# Patient Record
Sex: FEMALE | Race: WHITE | Employment: UNEMPLOYED | URBAN - METROPOLITAN AREA
[De-identification: names, ages, dates, MRNs, and addresses within clinical notes are randomized per-mention and may not be internally consistent; named-entity substitution may affect disease eponyms.]

---
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
## 2021-03-24 ENCOUNTER — HOSPITAL ENCOUNTER (EMERGENCY)
Age: 21
Discharge: HOME | End: 2021-03-24
Attending: EMERGENCY MEDICINE
Payer: COMMERCIAL

## 2021-03-24 VITALS
TEMPERATURE: 97.9 F | SYSTOLIC BLOOD PRESSURE: 168 MMHG | RESPIRATION RATE: 16 BRPM | HEART RATE: 106 BPM | HEIGHT: 65 IN | DIASTOLIC BLOOD PRESSURE: 82 MMHG | OXYGEN SATURATION: 96 % | WEIGHT: 293 LBS | BODY MASS INDEX: 48.82 KG/M2

## 2021-03-24 DIAGNOSIS — M25.512 LEFT SHOULDER PAIN, UNSPECIFIED CHRONICITY: Primary | ICD-10-CM

## 2021-03-24 PROCEDURE — 99283 EMERGENCY DEPT VISIT LOW MDM: CPT

## 2021-03-24 RX ORDER — PREDNISONE 20 MG/1
40 TABLET ORAL DAILY
Qty: 10 TABLET | Refills: 0 | Status: SHIPPED | OUTPATIENT
Start: 2021-03-24 | End: 2021-03-29

## 2021-03-24 ASSESSMENT — PAIN DESCRIPTION - LOCATION: LOCATION: SHOULDER

## 2021-03-24 ASSESSMENT — PAIN DESCRIPTION - ORIENTATION: ORIENTATION: LEFT

## 2021-03-24 NOTE — ED PROVIDER NOTES
[address] [facility]  ED PROVIDER NOTE    Patient Identification  Pt Name: Eleanor Rodriguez  MRN: [id number]  Cordeliagfmagda 2000  Date of evaluation: 3/24/2021  Provider: Cliff Seymour MD  PCP: No primary care provider on file. Chief Complaint  Shoulder Pain (left. states no injury. pt states yesterday morning shoulder hurt and then it stopped hurting. now shoulder hurts)      HPI  History provided by patient   This is a 22 y.o. female who presents to the ED for left shoulder pain. It radiates down to left fingers. Thinks she may have slept on it wrong. Originates at the left trapezius area. Denies trauma. Still has good function of the left arm. Pain seems intermittent. States that sensation is different but not decreased. No chest pain or shortness of breath. ROS  10 systems reviewed, pertinent positives/negatives per HPI otherwise noted to be negative. I have reviewed the following nursing documentation:  Allergies: Patient has no known allergies. Past medical history: History reviewed. No pertinent past medical history. Past surgical history:   Past Surgical History:   Procedure Laterality Date     SECTION N/A 2019     SECTION performed by Susanne Woodall MD at [address] [place] [address] L&D OR       Home medications:   Previous Medications    No medications on file       Social history:  reports that she has never smoked. She has never used smokeless tobacco. She reports previous alcohol use. She reports that she does not use drugs.     Family history:    Family History   Problem Relation Age of Onset    No Known Problems Mother     High Blood Pressure Father     No Known Problems Sister     No Known Problems Brother     No Known Problems Maternal Aunt     No Known Problems Maternal Uncle     No Known Problems Paternal Aunt     No Known Problems Paternal Uncle     Diabetes Maternal Grandmother     No Known Problems Maternal Grandfather     Diabetes Paternal Grandmother  Cancer Paternal Grandmother     No Known Problems Maternal Cousin     No Known Problems Paternal Cousin     No Known Problems Other          Exam  ED Triage Vitals   BP Temp Temp Source Pulse Resp SpO2 Height Weight   03/24/21 0352 03/24/21 0347 03/24/21 0347 03/24/21 0347 03/24/21 0347 03/24/21 0347 03/24/21 0347 03/24/21 0347   (!) 168/82 97.9 °F (36.6 °C) Oral 106 16 96 % 5' 5\" (1.651 m) 300 lb (136.1 kg)     Nursing note and vitals reviewed. Constitutional: In no acute distress  HENT:      Head: Normocephalic      Ears: External ears normal.      Nose: Nose normal.     Mouth: Membrane mucosa moist   Eyes: No discharge. Neck: Supple. Trachea midline. Cardiovascular: Regular rate. Warm extremities  Pulmonary/Chest: Effort normal. No respiratory distress. CTAB. Abdominal: Soft. No distension. Nontender  : Deferred  Rectal: Deferred   Musculoskeletal: Moves all extremities. No gross deformity. Neurological: Alert and oriented. Face symmetric. Speech is clear. Normal tone left arm. 5 out of 5 bilateral shoulder, elbow, wrist flexors/extensors strength. Normal sensation light palpation over deltoid and entire left upper extremity. Normal reflexes. Skin: Warm and dry. No rash. Psychiatric: Normal mood and affect. Behavior is normal.    Procedures            Radiology  No orders to display       Labs  No results found for this visit on 03/24/21. Screenings           MDM and ED Course  Patient is a 67-year-old woman who presents with left shoulder pain rating down to left fingertips. Likely secondary to radiculopathy. I do not believe that this is an anginal equivalent. Neuro exam unremarkable. Will start off with a course of steroids and ibuprofen. Patient agreeable to following up with her doctor this week . No midline spinal pain. Do not believe that this is due to stroke.   Do not believe that this is due to central cord compressive injury (EMP MDM)    [unfilled]    Final Impression  1. Left shoulder pain, unspecified chronicity        Blood pressure (!) 168/82, pulse 106, temperature 97.9 °F (36.6 °C), temperature source Oral, resp. rate 16, height 5' 5\" (1.651 m), weight 300 lb (136.1 kg), last menstrual period 02/22/2021, SpO2 96 %, unknown if currently breastfeeding. Disposition:  DISPOSITION Decision To Discharge 03/24/2021 04:01:41 AM      Patient Referrals:  No follow-up provider specified. Discharge Medications:  New Prescriptions    PREDNISONE (DELTASONE) 20 MG TABLET    Take 2 tablets by mouth daily for 5 days       Discontinued Medications:  Discontinued Medications    DOCUSATE SODIUM (COLACE, DULCOLAX) 100 MG CAPS    Take 100 mg by mouth 2 times daily as needed for Constipation    FERROUS SULFATE 325 (65 FE) MG TABLET    Take 1 tablet by mouth daily (with breakfast)    IBUPROFEN (ADVIL;MOTRIN) 800 MG TABLET    Take 1 tablet by mouth every 8 hours as needed for Pain    PRENATAL VIT-FE FUMARATE-FA (PRENATAL VITAMIN PO)    Take 1 tablet by mouth       This chart was generated using the Dragon dictation system. I created this record but it may contain dictation errors given the limitations of this technology.         Cliff Seymour MD  03/24/21 [id number]

## 2022-06-21 ENCOUNTER — HOSPITAL ENCOUNTER (OUTPATIENT)
Dept: ULTRASOUND IMAGING | Age: 22
Discharge: HOME | End: 2022-06-21
Payer: COMMERCIAL

## 2022-06-21 DIAGNOSIS — O36.80X0 PREGNANCY WITH INCONCLUSIVE FETAL VIABILITY, SINGLE OR UNSPECIFIED FETUS: ICD-10-CM

## 2022-06-21 PROCEDURE — 76801 OB US < 14 WKS SINGLE FETUS: CPT

## 2022-11-14 ENCOUNTER — HOSPITAL ENCOUNTER (OUTPATIENT)
Age: 22
Discharge: HOME | End: 2022-11-14
Attending: OBSTETRICS & GYNECOLOGY | Admitting: OBSTETRICS & GYNECOLOGY
Payer: COMMERCIAL

## 2022-11-14 VITALS
RESPIRATION RATE: 16 BRPM | BODY MASS INDEX: 48.82 KG/M2 | HEART RATE: 91 BPM | DIASTOLIC BLOOD PRESSURE: 74 MMHG | HEIGHT: 65 IN | TEMPERATURE: 98.3 F | SYSTOLIC BLOOD PRESSURE: 125 MMHG | WEIGHT: 293 LBS

## 2022-11-14 PROCEDURE — 99211 OFF/OP EST MAY X REQ PHY/QHP: CPT

## 2022-11-14 RX ORDER — ASPIRIN 81 MG/1
81 TABLET, CHEWABLE ORAL DAILY
COMMUNITY

## 2022-11-15 NOTE — H&P
Department of Obstetrics and Gynecology  Labor and Delivery  Attending Triage Note      SUBJECTIVE:  leakage of fluid    OBJECTIVE:  22y  at 30.3 weeks EDC 23 by LMP c/w 9w3d sono here for questionable LOF at 1930 pm, no gush of fluid but felt underwear wet so had to change them. Unsure if discharge, urine or amniotic fluid. Has an US in office today JAYDEN 7.58 cm. C/o some suprapubic pelvic pain earlier but currently resolved and denies any pain. Reports good FM, denies VB or CTX. Pregnancy c/b class 3 obesity- BMP 49.4, previous  section x1. Vitals:  Vitals:    22 2219   BP: 125/74   Pulse: 91   Resp: 16   Temp: 98.3 °F (36.8 °C)   TempSrc: Oral   Weight: 297 lb (134.7 kg)   Height: 5' 5\" (1.651 m)       CONSTITUTIONAL:  awake, alert, cooperative, no apparent distress, and appears stated age  ABDOMEN:  Soft non tender  MUSCULOSKELETAL:  Full range of motion    Cervix:  C/L/H    Fetal Position:  Breech (on US today)    Membranes:  Neg pooling, neg ferning, appear to be Intact    Fetal heart rate:         Baseline Heart Rate:  145 bpm         Accelerations:  present       Decelerations:  absent       Variability:  moderate    Contraction frequency: acontractile       ASSESSMENT & PLAN:      IUP at 30 weeks 3 days    No evidence of rupture of membranes  Reassuring fetal status    Neg pooling/neg ferning. Os closed. Recent US today reviewed with patient, JAYDEN wnl. NST reactive. Fetal well being reassuring, FHT reviewed at bedside. Emergency/Leakage/PTL precautions reviewed and when to call office. Follow up for next OB visit on 22 as scheduled. Stable for discharge home.       DO ERICH Morrow

## 2022-11-15 NOTE — FLOWSHEET NOTE
Pt presents to triage with complaints of leaking of fluid and abdominal pain. States noticed leaking around 1900 this evening. Reports +Fm, denies any leaking of fluid.  Will notify Dr. Adarsh Portillo of pt arrival.

## 2022-12-05 ENCOUNTER — APPOINTMENT (OUTPATIENT)
Dept: ULTRASOUND IMAGING | Age: 22
End: 2022-12-05
Payer: COMMERCIAL

## 2022-12-05 ENCOUNTER — HOSPITAL ENCOUNTER (OUTPATIENT)
Age: 22
Discharge: HOME | End: 2022-12-05
Attending: OBSTETRICS & GYNECOLOGY | Admitting: OBSTETRICS & GYNECOLOGY
Payer: COMMERCIAL

## 2022-12-05 VITALS
SYSTOLIC BLOOD PRESSURE: 135 MMHG | BODY MASS INDEX: 48.82 KG/M2 | WEIGHT: 293 LBS | HEART RATE: 99 BPM | DIASTOLIC BLOOD PRESSURE: 80 MMHG | HEIGHT: 65 IN | TEMPERATURE: 98.4 F | RESPIRATION RATE: 18 BRPM

## 2022-12-05 LAB
A/G RATIO: 1.3 (ref 1.1–2.2)
ALBUMIN SERPL-MCNC: 3.7 G/DL (ref 3.4–5)
ALP BLD-CCNC: 79 U/L (ref 40–129)
ALT SERPL-CCNC: 9 U/L (ref 10–40)
ANION GAP SERPL CALCULATED.3IONS-SCNC: 10 MMOL/L (ref 3–16)
APTT: 24.2 SEC (ref 23–34.3)
AST SERPL-CCNC: 13 U/L (ref 15–37)
BACTERIA: ABNORMAL /HPF
BASOPHILS ABSOLUTE: 0.1 K/UL (ref 0–0.2)
BASOPHILS RELATIVE PERCENT: 1 %
BILIRUB SERPL-MCNC: <0.2 MG/DL (ref 0–1)
BILIRUBIN URINE: NEGATIVE
BLOOD, URINE: NEGATIVE
BUN BLDV-MCNC: 5 MG/DL (ref 7–20)
CALCIUM SERPL-MCNC: 9.4 MG/DL (ref 8.3–10.6)
CHLORIDE BLD-SCNC: 102 MMOL/L (ref 99–110)
CLARITY: CLEAR
CO2: 21 MMOL/L (ref 21–32)
COLOR: YELLOW
CREAT SERPL-MCNC: 0.6 MG/DL (ref 0.6–1.1)
CREATININE URINE: 253.2 MG/DL (ref 28–259)
EOSINOPHILS ABSOLUTE: 0.1 K/UL (ref 0–0.6)
EOSINOPHILS RELATIVE PERCENT: 0.6 %
EPITHELIAL CELLS, UA: ABNORMAL /HPF (ref 0–5)
GFR SERPL CREATININE-BSD FRML MDRD: >60 ML/MIN/{1.73_M2}
GLUCOSE BLD-MCNC: 91 MG/DL (ref 70–99)
GLUCOSE URINE: NEGATIVE MG/DL
HCT VFR BLD CALC: 37.4 % (ref 36–48)
HEMOGLOBIN: 12.6 G/DL (ref 12–16)
INR BLD: 1.07 (ref 0.87–1.14)
KETONES, URINE: ABNORMAL MG/DL
LACTATE DEHYDROGENASE: 146 U/L (ref 100–190)
LEUKOCYTE ESTERASE, URINE: ABNORMAL
LYMPHOCYTES ABSOLUTE: 2.1 K/UL (ref 1–5.1)
LYMPHOCYTES RELATIVE PERCENT: 24.7 %
MCH RBC QN AUTO: 30.6 PG (ref 26–34)
MCHC RBC AUTO-ENTMCNC: 33.7 G/DL (ref 31–36)
MCV RBC AUTO: 90.5 FL (ref 80–100)
MICROSCOPIC EXAMINATION: YES
MONOCYTES ABSOLUTE: 0.7 K/UL (ref 0–1.3)
MONOCYTES RELATIVE PERCENT: 8 %
MUCUS: ABNORMAL /LPF
NEUTROPHILS ABSOLUTE: 5.6 K/UL (ref 1.7–7.7)
NEUTROPHILS RELATIVE PERCENT: 65.7 %
NITRITE, URINE: NEGATIVE
PDW BLD-RTO: 12.9 % (ref 12.4–15.4)
PH UA: 6.5 (ref 5–8)
PLATELET # BLD: 227 K/UL (ref 135–450)
PMV BLD AUTO: 8.2 FL (ref 5–10.5)
POTASSIUM SERPL-SCNC: 4.1 MMOL/L (ref 3.5–5.1)
PROTEIN PROTEIN: 27 MG/DL
PROTEIN UA: NEGATIVE MG/DL
PROTEIN/CREAT RATIO: 0.1 MG/DL
PROTHROMBIN TIME: 13.8 SEC (ref 11.7–14.5)
RBC # BLD: 4.14 M/UL (ref 4–5.2)
RBC UA: ABNORMAL /HPF (ref 0–4)
SODIUM BLD-SCNC: 133 MMOL/L (ref 136–145)
SPECIFIC GRAVITY UA: 1.02 (ref 1–1.03)
TOTAL PROTEIN: 6.5 G/DL (ref 6.4–8.2)
URIC ACID, SERUM: 5.1 MG/DL (ref 2.6–6)
URINE TYPE: ABNORMAL
UROBILINOGEN, URINE: 0.2 E.U./DL
WBC # BLD: 8.6 K/UL (ref 4–11)
WBC UA: ABNORMAL /HPF (ref 0–5)

## 2022-12-05 PROCEDURE — 99211 OFF/OP EST MAY X REQ PHY/QHP: CPT

## 2022-12-05 PROCEDURE — 84156 ASSAY OF PROTEIN URINE: CPT

## 2022-12-05 PROCEDURE — 83615 LACTATE (LD) (LDH) ENZYME: CPT

## 2022-12-05 PROCEDURE — 85730 THROMBOPLASTIN TIME PARTIAL: CPT

## 2022-12-05 PROCEDURE — 85610 PROTHROMBIN TIME: CPT

## 2022-12-05 PROCEDURE — 81001 URINALYSIS AUTO W/SCOPE: CPT

## 2022-12-05 PROCEDURE — 84550 ASSAY OF BLOOD/URIC ACID: CPT

## 2022-12-05 PROCEDURE — 85025 COMPLETE CBC W/AUTO DIFF WBC: CPT

## 2022-12-05 PROCEDURE — 36415 COLL VENOUS BLD VENIPUNCTURE: CPT

## 2022-12-05 PROCEDURE — 82570 ASSAY OF URINE CREATININE: CPT

## 2022-12-05 PROCEDURE — 76819 FETAL BIOPHYS PROFIL W/O NST: CPT

## 2022-12-05 PROCEDURE — 80053 COMPREHEN METABOLIC PANEL: CPT

## 2022-12-05 NOTE — PROGRESS NOTES
EFM turned off.  Transport is on their way to transport pt to [address] [facility] [address]

## 2022-12-05 NOTE — PROGRESS NOTES
Pt verbalized understanding of verbal and written discharge instructions and denies having questions at this time. Pt left OB unit at 1645 ambulatory, undelivered, and in stable condition, accompanied by FOB. Patient is not in active labor.

## 2022-12-05 NOTE — PROGRESS NOTES
Dr Gumaro Acevedo reviewed BPP. This RN went over with pt that her fluid is borderline low and to make sure she keeps her appointment with MFM next week. Pt verbalized understanding.

## 2022-12-05 NOTE — H&P
Department of Obstetrics and Gynecology  Labor and Delivery  Attending Triage Note      SUBJECTIVE:  22y  at 33.3 weeks sent from office for HA, spots in vision and trace protein on ur dip. Upon interview in triage she otherwise denies RUQ/epigastric pain, no facial swelling/lower or upper ext swelling. she reports good FM, denies LOF, VB, CTX. Pregnancy c/b class 3 obesity BMI 51, previous  section x1, lapse in care only 5-6 prenatal visit total.  She is scheduled for a transfer of care visit to [place] [id number] on 22.        OBJECTIVE    Vitals:  Vitals:    22 1615 22 1630 22 1645   BP: 128/74 128/81 131/81   Pulse: 88 88 88   Resp: 18     Temp: 98.4 °F (36.9 °C)     TempSrc: Oral     Weight: (!) 306 lb (138.8 kg)     Height: 5' 5\" (1.651 m)         CONSTITUTIONAL:  awake, alert, cooperative, no apparent distress, and appears stated age  ABDOMEN:  Soft non tender  MUSCULOSKELETAL:  Full range of motion    Cervix:  deferred    Fetal heart rate:         Baseline Heart Rate:  135 bpm        Accelerations:  present       Decelerations:  absent       Variability:  moderate    Contraction frequency: irritability     DATA:  Results for orders placed or performed during the hospital encounter of 22   Comprehensive metabolic panel   Result Value Ref Range    CO2 21 21 - 32 mmol/L    Glucose 91 70 - 99 mg/dL    BUN 5 (L) 7 - 20 mg/dL    Creatinine 0.6 0.6 - 1.1 mg/dL    Est, Glom Filt Rate >60 >60    Calcium 9.4 8.3 - 10.6 mg/dL    Total Protein 6.5 6.4 - 8.2 g/dL    Albumin 3.7 3.4 - 5.0 g/dL    Albumin/Globulin Ratio 1.3 1.1 - 2.2    Total Bilirubin <0.2 0.0 - 1.0 mg/dL    Alkaline Phosphatase 79 40 - 129 U/L    ALT 9 (L) 10 - 40 U/L    AST 13 (L) 15 - 37 U/L   CBC with Auto Differential   Result Value Ref Range    WBC 8.6 4.0 - 11.0 K/uL    RBC 4.14 4.00 - 5.20 M/uL    Hemoglobin 12.6 12.0 - 16.0 g/dL    Hematocrit 37.4 36.0 - 48.0 %    MCV 90.5 80.0 - 100.0 fL    MCH 30.6 26.0 - 34.0 pg    MCHC 33.7 31.0 - 36.0 g/dL    RDW 12.9 12.4 - 15.4 %    Platelets 572 095 - 712 K/uL    MPV 8.2 5.0 - 10.5 fL    Neutrophils % 65.7 %    Lymphocytes % 24.7 %    Monocytes % 8.0 %    Eosinophils % 0.6 %    Basophils % 1.0 %    Neutrophils Absolute 5.6 1.7 - 7.7 K/uL    Lymphocytes Absolute 2.1 1.0 - 5.1 K/uL    Monocytes Absolute 0.7 0.0 - 1.3 K/uL    Eosinophils Absolute 0.1 0.0 - 0.6 K/uL    Basophils Absolute 0.1 0.0 - 0.2 K/uL   Protime-INR   Result Value Ref Range    Protime 13.8 11.7 - 14.5 sec    INR 1.07 0.87 - 1.14   Lactate Dehydrogenase   Result Value Ref Range     100 - 190 U/L   Protein / Creatinine Ratio, Urine   Result Value Ref Range    Protein, Ur 27.00 (H) <12 mg/dL    Creatinine, Ur 253.2 28.0 - 259.0 mg/dL    Protein/Creat Ratio 0.1 mg/dL   APTT   Result Value Ref Range    aPTT 24.2 23.0 - 34.3 sec   Uric acid   Result Value Ref Range    Uric Acid, Serum 5.1 2.6 - 6.0 mg/dL   Urinalysis   Result Value Ref Range    Color, UA Yellow Straw/Yellow    Clarity, UA Clear Clear    Glucose, Ur Negative Negative mg/dL    Bilirubin Urine Negative Negative    Ketones, Urine TRACE (A) Negative mg/dL    Specific Gravity, UA 1.025 1.005 - 1.030    Blood, Urine Negative Negative    pH, UA 6.5 5.0 - 8.0    Protein, UA Negative Negative mg/dL    Urobilinogen, Urine 0.2 <2.0 E.U./dL    Nitrite, Urine Negative Negative    Leukocyte Esterase, Urine SMALL (A) Negative    Microscopic Examination YES     Urine Type NotGiven    Microscopic Urinalysis   Result Value Ref Range    Mucus, UA 1+ (A) None Seen /LPF    WBC, UA 6-9 (A) 0 - 5 /HPF    RBC, UA 0-2 0 - 4 /HPF    Epithelial Cells, UA 21-50 (A) 0 - 5 /HPF    Bacteria, UA 4+ (A) None Seen /HPF         ASSESSMENT & PLAN:      IUP at 33.3 weeks:     Headache/scotomas  -Normotensive since admission. Symptoms improved, denies HA and scotomas on reevaluation. Exam benign. BP range (128-135/74-81). Preeclampsia labs wnl, P/C ratio 0.1.   Low suspicion for preeclampsia. Trace ketones- cup of water provided, advised to increase PO hydration at home as well. May take tylenol prn HA. Preeclampsia precautions reviewed. Lapse in prenatal care  -Reviewed importance on compliance with care to reduce risk of poor obstetrical outcome. She has not started ANFS as recommended- class 3 obesity, BPP ordered-Has appt for complete transfer of care visit to  on 12/12/22     Reassuring fetal status  -Reassuring fetal well being. NST reactive, FHT findings reviewed and bedside with patient  -8/8, Breech, borderline low normal JAYDEN 5.85 cm, however MVP >2, wnl. Keep appt as scheduled at [facility], can continue to trend weekly with ANFS. Stable for discharge home. Emergency precautions reviewed and when to call office.       Pedro Romero,   OB/GYN

## (undated) DEVICE — SUTURE VCRL SZ 0 L36IN ABSRB UD L36MM CT-1 1/2 CIR J946H

## (undated) DEVICE — Device

## (undated) DEVICE — SUTURE VCRL SZ 3-0 L36IN ABSRB UD L36MM CT-1 1/2 CIR J944H

## (undated) DEVICE — GARMENT COMPR L FOR 23IN CALF FLOTRN

## (undated) DEVICE — GLOVE ORANGE PI 7 1/2   MSG9075

## (undated) DEVICE — SUTURE MCRYL SZ 0 L36IN ABSRB VLT CT L40MM 1/2 CIR TAPR PNT Y358H

## (undated) DEVICE — TRAY URIN CATH 16FR DRNGE BG STATLOK STBL DEV F SURSTP

## (undated) DEVICE — PAD,NON-ADHERENT,3X8,STERILE,LF,1/PK: Brand: MEDLINE

## (undated) DEVICE — CHLORAPREP 26ML ORANGE

## (undated) DEVICE — DRESSING COMP IS W4XL10IN PD W2XL8IN CNTCT LAYR ADH

## (undated) DEVICE — S/USE RESUS KIT W/O MASK (10): Brand: FISHER & PAYKEL HEALTHCARE

## (undated) DEVICE — 3M™ STERI-STRIP™ COMPOUND BENZOIN TINCTURE 40 BAGS/CARTON 4 CARTONS/CASE C1544: Brand: 3M™ STERI-STRIP™

## (undated) DEVICE — SOLUTION IV IRRIG POUR BRL 0.9% SODIUM CHL 2F7124

## (undated) DEVICE — SUTURE VCRL SZ 4-0 L27IN ABSRB UD L60MM KS STR REV CUT NDL J662H